# Patient Record
Sex: MALE | Race: WHITE | Employment: FULL TIME | URBAN - METROPOLITAN AREA
[De-identification: names, ages, dates, MRNs, and addresses within clinical notes are randomized per-mention and may not be internally consistent; named-entity substitution may affect disease eponyms.]

---

## 2018-07-18 ENCOUNTER — OFFICE VISIT (OUTPATIENT)
Dept: FAMILY MEDICINE CLINIC | Age: 28
End: 2018-07-18

## 2018-07-18 VITALS
OXYGEN SATURATION: 98 % | BODY MASS INDEX: 26.18 KG/M2 | HEART RATE: 84 BPM | DIASTOLIC BLOOD PRESSURE: 84 MMHG | WEIGHT: 215 LBS | SYSTOLIC BLOOD PRESSURE: 142 MMHG | HEIGHT: 76 IN

## 2018-07-18 DIAGNOSIS — R07.89 OTHER CHEST PAIN: Primary | ICD-10-CM

## 2018-07-18 DIAGNOSIS — Z11.4 SCREENING FOR HIV WITHOUT PRESENCE OF RISK FACTORS: ICD-10-CM

## 2018-07-18 DIAGNOSIS — R10.13 EPIGASTRIC PAIN: ICD-10-CM

## 2018-07-18 LAB
A/G RATIO: 2 (ref 1.1–2.2)
ALBUMIN SERPL-MCNC: 4.8 G/DL (ref 3.4–5)
ALP BLD-CCNC: 58 U/L (ref 40–129)
ALT SERPL-CCNC: 16 U/L (ref 10–40)
AMYLASE: 57 U/L (ref 25–115)
ANION GAP SERPL CALCULATED.3IONS-SCNC: 15 MMOL/L (ref 3–16)
AST SERPL-CCNC: 14 U/L (ref 15–37)
BASOPHILS ABSOLUTE: 0 K/UL (ref 0–0.2)
BASOPHILS RELATIVE PERCENT: 0.6 %
BILIRUB SERPL-MCNC: 0.7 MG/DL (ref 0–1)
BILIRUBIN URINE: NEGATIVE
BLOOD, URINE: NEGATIVE
BUN BLDV-MCNC: 21 MG/DL (ref 7–20)
CALCIUM SERPL-MCNC: 9.9 MG/DL (ref 8.3–10.6)
CHLORIDE BLD-SCNC: 101 MMOL/L (ref 99–110)
CLARITY: CLEAR
CO2: 25 MMOL/L (ref 21–32)
COLOR: NORMAL
CREAT SERPL-MCNC: 1.1 MG/DL (ref 0.9–1.3)
EOSINOPHILS ABSOLUTE: 0.1 K/UL (ref 0–0.6)
EOSINOPHILS RELATIVE PERCENT: 1.1 %
EPITHELIAL CELLS, UA: 0 /HPF (ref 0–5)
GFR AFRICAN AMERICAN: >60
GFR NON-AFRICAN AMERICAN: >60
GLOBULIN: 2.4 G/DL
GLUCOSE BLD-MCNC: 124 MG/DL (ref 70–99)
GLUCOSE URINE: NEGATIVE MG/DL
HCT VFR BLD CALC: 44.2 % (ref 40.5–52.5)
HEMOGLOBIN: 15.3 G/DL (ref 13.5–17.5)
HYALINE CASTS: 0 /LPF (ref 0–8)
KETONES, URINE: NEGATIVE MG/DL
LEUKOCYTE ESTERASE, URINE: NEGATIVE
LIPASE: 29 U/L (ref 13–60)
LYMPHOCYTES ABSOLUTE: 2 K/UL (ref 1–5.1)
LYMPHOCYTES RELATIVE PERCENT: 30.6 %
MCH RBC QN AUTO: 31.1 PG (ref 26–34)
MCHC RBC AUTO-ENTMCNC: 34.7 G/DL (ref 31–36)
MCV RBC AUTO: 89.6 FL (ref 80–100)
MICROSCOPIC EXAMINATION: NORMAL
MONOCYTES ABSOLUTE: 0.4 K/UL (ref 0–1.3)
MONOCYTES RELATIVE PERCENT: 5.7 %
NEUTROPHILS ABSOLUTE: 4.1 K/UL (ref 1.7–7.7)
NEUTROPHILS RELATIVE PERCENT: 62 %
NITRITE, URINE: NEGATIVE
PDW BLD-RTO: 12.7 % (ref 12.4–15.4)
PH UA: 5.5
PLATELET # BLD: 180 K/UL (ref 135–450)
PMV BLD AUTO: 9.4 FL (ref 5–10.5)
POTASSIUM SERPL-SCNC: 3.9 MMOL/L (ref 3.5–5.1)
PROTEIN UA: NEGATIVE MG/DL
RBC # BLD: 4.93 M/UL (ref 4.2–5.9)
RBC UA: 2 /HPF (ref 0–4)
SODIUM BLD-SCNC: 141 MMOL/L (ref 136–145)
SPECIFIC GRAVITY UA: 1.03
TOTAL PROTEIN: 7.2 G/DL (ref 6.4–8.2)
TSH REFLEX: 0.84 UIU/ML (ref 0.27–4.2)
UROBILINOGEN, URINE: 0.2 E.U./DL
WBC # BLD: 6.7 K/UL (ref 4–11)
WBC UA: 0 /HPF (ref 0–5)

## 2018-07-18 PROCEDURE — 99203 OFFICE O/P NEW LOW 30 MIN: CPT | Performed by: FAMILY MEDICINE

## 2018-07-18 PROCEDURE — 1036F TOBACCO NON-USER: CPT | Performed by: FAMILY MEDICINE

## 2018-07-18 PROCEDURE — G8427 DOCREV CUR MEDS BY ELIG CLIN: HCPCS | Performed by: FAMILY MEDICINE

## 2018-07-18 PROCEDURE — G8419 CALC BMI OUT NRM PARAM NOF/U: HCPCS | Performed by: FAMILY MEDICINE

## 2018-07-18 PROCEDURE — 36415 COLL VENOUS BLD VENIPUNCTURE: CPT | Performed by: FAMILY MEDICINE

## 2018-07-18 ASSESSMENT — PATIENT HEALTH QUESTIONNAIRE - PHQ9
1. LITTLE INTEREST OR PLEASURE IN DOING THINGS: 0
SUM OF ALL RESPONSES TO PHQ QUESTIONS 1-9: 0
SUM OF ALL RESPONSES TO PHQ9 QUESTIONS 1 & 2: 0
2. FEELING DOWN, DEPRESSED OR HOPELESS: 0

## 2018-07-18 ASSESSMENT — ENCOUNTER SYMPTOMS
COUGH: 0
EYE PAIN: 0
ABDOMINAL PAIN: 1
CONSTIPATION: 0
VOMITING: 0
COLOR CHANGE: 0
EYE DISCHARGE: 0
DIARRHEA: 0
CHEST TIGHTNESS: 0
RHINORRHEA: 0
BLOOD IN STOOL: 0
SINUS PRESSURE: 0
WHEEZING: 0
SHORTNESS OF BREATH: 0

## 2018-07-18 NOTE — PROGRESS NOTES
Subjective:      Patient ID: Alex Samayoa is a 33 y.o. male. HPI  Chief Complaint   Patient presents with    New Patient     Patient is here to establish care with Dr. Eldon Forrest as a new patient.  Pain     He mentions he has pain in his left rib cage for about 2 years now. Has had EKG's which always come back normal    Abdominal Pain     In addition he has stomach cramps and pain shortly after he eats, this began a few weeks ago     Here to establish care. Nonsmoker. Last PCP unknown. Has been using emergency rooms for care. States typically healthy. he has had intermittent chest pressure for about 2 yrs. located on the left side around lower rib cage. States feels a stabbing pain at times. Other times feels just like a constant pressure. Denies any shortness of breath, cough, hemoptysis. Denies any weight change. States does not come on with any movement or position change. Denies any reflux or chronic indigestion. Not affected by food has had chest x-rays and EKG at the emergency room most recently last year at [facility]. All was within normal limits. He uses the happens when he lies flat. No radiation of pain  Also complain of abdominal pain and cramping for the last 2 weeks after eating. Happens with eating anything. Denies any vomiting, diarrhea, blood in the stool. Denies any change in diet. Has not taken anything for it. Has not tried antacids.   States this week pain is gone  Alex Samayoa is a 33 y.o. male with the following history as recorded in [facility]:  Patient Active Problem List    Diagnosis Date Noted    Medial meniscus tear 09/18/2013     Priority: High     Class: Acute    Knee effusion, left 09/12/2013     Priority: High     Class: Acute    Left knee pain 09/12/2013     Priority: High     Class: Acute     Current Outpatient Prescriptions   Medication Sig Dispense Refill    Multiple Vitamins-Minerals (THERAPEUTIC MULTIVITAMIN-MINERALS) tablet Take 1 tablet by mouth daily No current facility-administered medications for this visit. Allergies: Zithromax [azithromycin]  Past Medical History:   Diagnosis Date    Bronchitis      Past Surgical History:   Procedure Laterality Date    KNEE ARTHROSCOPY Left 12/24/2013    KNEE SURGERY Right      Family History   Problem Relation Age of Onset    No Known Problems Sister     No Known Problems Brother     Lung Cancer Maternal Grandmother     No Known Problems Sister     No Known Problems Brother     Other Brother         Muscular Dystrophy     Social History   Substance Use Topics    Smoking status: Never Smoker    Smokeless tobacco: Never Used    Alcohol use Yes      Comment: occasional     There were no vitals filed for this visit. There is no height or weight on file to calculate BMI. Wt Readings from Last 3 Encounters:   06/17/17 205 lb (93 kg)   08/12/16 205 lb (93 kg)   01/12/16 210 lb (95.3 kg)     BP Readings from Last 3 Encounters:   06/17/17 138/84   08/12/16 148/81   01/13/16 142/86            Review of Systems   Constitutional: Negative for fatigue, fever and unexpected weight change. HENT: Negative for congestion, ear discharge, ear pain, hearing loss, rhinorrhea and sinus pressure. Eyes: Negative for pain, discharge and visual disturbance. Respiratory: Negative for cough, chest tightness, shortness of breath and wheezing. Cardiovascular: Positive for chest pain. Negative for palpitations and leg swelling. Gastrointestinal: Positive for abdominal pain. Negative for blood in stool, constipation, diarrhea and vomiting. Genitourinary: Negative for difficulty urinating, dysuria and hematuria. Musculoskeletal: Negative for arthralgias and myalgias. Skin: Negative for color change and rash. Neurological: Negative for dizziness, seizures, syncope and headaches. Hematological: Negative for adenopathy. Does not bruise/bleed easily.    Psychiatric/Behavioral: Negative for dysphoric mood and sleep disturbance. The patient is not nervous/anxious (.orders). Objective:   Physical Exam   Constitutional: He is oriented to person, place, and time. He appears well-developed and well-nourished. No distress. HENT:   Head: Normocephalic. Right Ear: External ear normal.   Left Ear: External ear normal.   Nose: Nose normal.   Mouth/Throat: Oropharynx is clear and moist. No oropharyngeal exudate. Eyes: Conjunctivae and EOM are normal. Pupils are equal, round, and reactive to light. No scleral icterus. Neck: Neck supple. No thyromegaly present. Cardiovascular: Normal rate, regular rhythm, normal heart sounds and intact distal pulses. No murmur heard. Pulmonary/Chest: Effort normal and breath sounds normal. He has no wheezes. He exhibits no tenderness (unable to reprodcue pain with palpation). Abdominal: Soft. Bowel sounds are normal. He exhibits no distension. There is no tenderness. There is no rebound and no guarding. Musculoskeletal: Normal range of motion. He exhibits no edema or tenderness. Lymphadenopathy:     He has no cervical adenopathy. Neurological: He is alert and oriented to person, place, and time. No cranial nerve deficit. Coordination normal.   Skin: Skin is warm and dry. No erythema. Psychiatric: He has a normal mood and affect. His behavior is normal. Judgment and thought content normal.       Assessment:      Chest pain- non cardiac, not reproducible x 2 yrs. cxr clear, EKG wnl- get CT scan  abd pain-?  Acute gastritis      Plan:       Orders Placed This Encounter   Procedures    CT Chest W Contrast     Standing Status:   Future     Standing Expiration Date:   7/18/2019     Order Specific Question:   Reason for exam:     Answer:   left sided chest pain x 2 years    TSH with Reflex    CBC WITH AUTO DIFFERENTIAL    COMPREHENSIVE METABOLIC PANEL    AMYLASE    LIPASE    HIV-1 AND HIV-2 ANTIBODIES    MICROSCOPIC URINALYSIS     Standing Status:   Future     Standing

## 2018-07-19 LAB
HIV AG/AB: NORMAL
HIV ANTIGEN: NORMAL
HIV-1 ANTIBODY: NORMAL
HIV-2 AB: NORMAL

## 2018-08-01 ENCOUNTER — HOSPITAL ENCOUNTER (OUTPATIENT)
Dept: CT IMAGING | Age: 28
Discharge: HOME | End: 2018-08-01
Payer: MEDICAID

## 2018-08-01 DIAGNOSIS — R07.89 OTHER CHEST PAIN: ICD-10-CM

## 2018-08-01 PROCEDURE — 71260 CT THORAX DX C+: CPT

## 2018-08-01 PROCEDURE — 6360000004 HC RX CONTRAST MEDICATION: Performed by: FAMILY MEDICINE

## 2018-08-01 RX ADMIN — IOPAMIDOL 75 ML: 755 INJECTION, SOLUTION INTRAVENOUS at 14:23

## 2018-08-07 ENCOUNTER — TELEPHONE (OUTPATIENT)
Dept: FAMILY MEDICINE CLINIC | Age: 28
End: 2018-08-07

## 2018-08-08 ENCOUNTER — OFFICE VISIT (OUTPATIENT)
Dept: FAMILY MEDICINE CLINIC | Age: 28
End: 2018-08-08

## 2018-08-08 VITALS
HEART RATE: 68 BPM | DIASTOLIC BLOOD PRESSURE: 68 MMHG | WEIGHT: 218.6 LBS | TEMPERATURE: 98 F | SYSTOLIC BLOOD PRESSURE: 120 MMHG | OXYGEN SATURATION: 98 % | HEIGHT: 76 IN | BODY MASS INDEX: 26.62 KG/M2

## 2018-08-08 DIAGNOSIS — R16.1 SPLENOMEGALY: ICD-10-CM

## 2018-08-08 DIAGNOSIS — R10.13 EPIGASTRIC PAIN: ICD-10-CM

## 2018-08-08 DIAGNOSIS — J01.00 ACUTE MAXILLARY SINUSITIS, RECURRENCE NOT SPECIFIED: Primary | ICD-10-CM

## 2018-08-08 PROCEDURE — 99213 OFFICE O/P EST LOW 20 MIN: CPT | Performed by: FAMILY MEDICINE

## 2018-08-08 PROCEDURE — G8427 DOCREV CUR MEDS BY ELIG CLIN: HCPCS | Performed by: FAMILY MEDICINE

## 2018-08-08 PROCEDURE — G8419 CALC BMI OUT NRM PARAM NOF/U: HCPCS | Performed by: FAMILY MEDICINE

## 2018-08-08 PROCEDURE — 1036F TOBACCO NON-USER: CPT | Performed by: FAMILY MEDICINE

## 2018-08-08 RX ORDER — RANITIDINE 150 MG/1
150 TABLET ORAL DAILY
Qty: 30 TABLET | Refills: 3 | Status: SHIPPED
Start: 2018-08-08 | End: 2020-10-23 | Stop reason: ALTCHOICE

## 2018-08-08 RX ORDER — AMOXICILLIN 500 MG/1
500 CAPSULE ORAL 2 TIMES DAILY
Qty: 20 CAPSULE | Refills: 0 | Status: SHIPPED | OUTPATIENT
Start: 2018-08-08 | End: 2018-08-18

## 2018-08-08 ASSESSMENT — ENCOUNTER SYMPTOMS
COUGH: 0
SHORTNESS OF BREATH: 0
SINUS PRESSURE: 1
ABDOMINAL PAIN: 1
RHINORRHEA: 1
CHEST TIGHTNESS: 0
SORE THROAT: 0
WHEEZING: 0

## 2018-08-08 NOTE — PROGRESS NOTES
Subjective:      Patient ID: Davis Santizo is a 33 y.o. male. HPI  Chief Complaint   Patient presents with    Abdominal Pain     Patient is here with continued complaints of abdominal pain. Pain has been recurrent for about 2 months, pain is located in middle quadrant of abdomen. Pain went away for 2 weeks but returned yesterday, usually is a dull ache but will get episodes that are severe causing him to double over.  Nasal Congestion     He also mentions he has some nasal congestion and mild cough which began a few days ago. Here for c/o pain in stomach x few days, on and off. Worse yesterday. Feels it in middle. No fever, vomiting or diarrhea. No burning. Appetite not affected  No otc meds  Also c/o sinus drainage, thick colored and sinus pressure x months. No allergy meds used  Vitals:    08/08/18 0939   BP: 120/68   Pulse: 68   Temp: 98 °F (36.7 °C)   TempSrc: Oral   SpO2: 98%   Weight: 218 lb 9.6 oz (99.2 kg)   Height: 6' 4\" (1.93 m)     Body mass index is 26.61 kg/m². Wt Readings from Last 3 Encounters:   08/08/18 218 lb 9.6 oz (99.2 kg)   07/18/18 215 lb (97.5 kg)   06/17/17 205 lb (93 kg)     BP Readings from Last 3 Encounters:   08/08/18 120/68   07/18/18 (!) 142/84   06/17/17 138/84        Review of Systems   Constitutional: Negative for chills and fever. HENT: Positive for congestion, postnasal drip, rhinorrhea and sinus pressure. Negative for sore throat. Respiratory: Negative for cough, chest tightness, shortness of breath and wheezing. Cardiovascular: Negative for chest pain. Gastrointestinal: Positive for abdominal pain. Objective:   Physical Exam   Constitutional: He is oriented to person, place, and time. He appears well-developed and well-nourished. No distress. HENT:   Head: Normocephalic. Right Ear: External ear normal.   Left Ear: External ear normal.   ++nasal edema and erythema  + post pharynx red   Neck: Neck supple.    Cardiovascular: Normal rate, regular rhythm and normal heart sounds. Pulmonary/Chest: Effort normal and breath sounds normal. No respiratory distress. He has no wheezes. Abdominal: He exhibits no distension. There is no tenderness. There is no rebound and no guarding. Neurological: He is alert and oriented to person, place, and time. No cranial nerve deficit. Coordination normal.       Assessment:      Sinusitis- x months, add abx  Epigastric pain-?  Gastritis, try zantac      Plan:      Orders Placed This Encounter   Procedures   Shereen De La Garza MD     Referral Priority:   Routine     Referral Type:   Consult for Advice and Opinion     Referral Reason:   Specialty Services Required     Referred to Provider:   Brooklynn Lim MD     Requested Specialty:   Hematology and Oncology     Number of Visits Requested:   1     Orders Placed This Encounter   Medications    amoxicillin (AMOXIL) 500 MG capsule     Sig: Take 1 capsule by mouth 2 times daily for 10 days     Dispense:  20 capsule     Refill:  0    ranitidine (ZANTAC) 150 MG tablet     Sig: Take 1 tablet by mouth daily     Dispense:  30 tablet     Refill:  3             ESTEFANIA [address] DO RYAN

## 2018-08-08 NOTE — PATIENT INSTRUCTIONS

## 2018-08-23 ENCOUNTER — HOSPITAL ENCOUNTER (OUTPATIENT)
Dept: ULTRASOUND IMAGING | Age: 28
Discharge: HOME | End: 2018-08-23
Payer: MEDICAID

## 2018-08-23 DIAGNOSIS — R16.1 SPLENOMEGALY: ICD-10-CM

## 2018-08-23 PROCEDURE — 76705 ECHO EXAM OF ABDOMEN: CPT

## 2020-10-23 ENCOUNTER — HOSPITAL ENCOUNTER (OUTPATIENT)
Age: 30
Setting detail: OBSERVATION
Discharge: HOME | End: 2020-10-24
Attending: EMERGENCY MEDICINE | Admitting: INTERNAL MEDICINE
Payer: COMMERCIAL

## 2020-10-23 ENCOUNTER — APPOINTMENT (OUTPATIENT)
Dept: GENERAL RADIOLOGY | Age: 30
End: 2020-10-23
Payer: COMMERCIAL

## 2020-10-23 ENCOUNTER — APPOINTMENT (OUTPATIENT)
Dept: CT IMAGING | Age: 30
End: 2020-10-23
Payer: COMMERCIAL

## 2020-10-23 PROBLEM — R20.2 FACIAL PARESTHESIA: Status: ACTIVE | Noted: 2020-10-23

## 2020-10-23 LAB
A/G RATIO: 2.2 (ref 1.1–2.2)
ALBUMIN SERPL-MCNC: 4.9 G/DL (ref 3.4–5)
ALP BLD-CCNC: 68 U/L (ref 40–129)
ALT SERPL-CCNC: 24 U/L (ref 10–40)
ANION GAP SERPL CALCULATED.3IONS-SCNC: 11 MMOL/L (ref 3–16)
AST SERPL-CCNC: 18 U/L (ref 15–37)
BASOPHILS ABSOLUTE: 0.1 K/UL (ref 0–0.2)
BASOPHILS RELATIVE PERCENT: 0.7 %
BILIRUB SERPL-MCNC: 0.4 MG/DL (ref 0–1)
BUN BLDV-MCNC: 17 MG/DL (ref 7–20)
CALCIUM SERPL-MCNC: 9.4 MG/DL (ref 8.3–10.6)
CHLORIDE BLD-SCNC: 100 MMOL/L (ref 99–110)
CO2: 26 MMOL/L (ref 21–32)
CREAT SERPL-MCNC: 1.2 MG/DL (ref 0.9–1.3)
EOSINOPHILS ABSOLUTE: 0.1 K/UL (ref 0–0.6)
EOSINOPHILS RELATIVE PERCENT: 1.3 %
GFR AFRICAN AMERICAN: >60
GFR NON-AFRICAN AMERICAN: >60
GLOBULIN: 2.2 G/DL
GLUCOSE BLD-MCNC: 98 MG/DL (ref 70–99)
HCT VFR BLD CALC: 43 % (ref 40.5–52.5)
HEMOGLOBIN: 14.8 G/DL (ref 13.5–17.5)
INR BLD: 1 (ref 0.86–1.14)
LYMPHOCYTES ABSOLUTE: 2.1 K/UL (ref 1–5.1)
LYMPHOCYTES RELATIVE PERCENT: 30.1 %
MAGNESIUM: 2.1 MG/DL (ref 1.8–2.4)
MCH RBC QN AUTO: 30.7 PG (ref 26–34)
MCHC RBC AUTO-ENTMCNC: 34.5 G/DL (ref 31–36)
MCV RBC AUTO: 89.2 FL (ref 80–100)
MONOCYTES ABSOLUTE: 0.5 K/UL (ref 0–1.3)
MONOCYTES RELATIVE PERCENT: 7.4 %
NEUTROPHILS ABSOLUTE: 4.3 K/UL (ref 1.7–7.7)
NEUTROPHILS RELATIVE PERCENT: 60.5 %
PDW BLD-RTO: 12.9 % (ref 12.4–15.4)
PHOSPHORUS: 3.5 MG/DL (ref 2.5–4.9)
PLATELET # BLD: 176 K/UL (ref 135–450)
PMV BLD AUTO: 9.3 FL (ref 5–10.5)
POTASSIUM REFLEX MAGNESIUM: 3.9 MMOL/L (ref 3.5–5.1)
PROTHROMBIN TIME: 11.6 SEC (ref 10–13.2)
RBC # BLD: 4.82 M/UL (ref 4.2–5.9)
SODIUM BLD-SCNC: 137 MMOL/L (ref 136–145)
TOTAL PROTEIN: 7.1 G/DL (ref 6.4–8.2)
TROPONIN: <0.01 NG/ML
WBC # BLD: 7.1 K/UL (ref 4–11)

## 2020-10-23 PROCEDURE — 85610 PROTHROMBIN TIME: CPT

## 2020-10-23 PROCEDURE — G0378 HOSPITAL OBSERVATION PER HR: HCPCS

## 2020-10-23 PROCEDURE — 84484 ASSAY OF TROPONIN QUANT: CPT

## 2020-10-23 PROCEDURE — 6370000000 HC RX 637 (ALT 250 FOR IP): Performed by: INTERNAL MEDICINE

## 2020-10-23 PROCEDURE — 99285 EMERGENCY DEPT VISIT HI MDM: CPT

## 2020-10-23 PROCEDURE — 70450 CT HEAD/BRAIN W/O DYE: CPT

## 2020-10-23 PROCEDURE — 71045 X-RAY EXAM CHEST 1 VIEW: CPT

## 2020-10-23 PROCEDURE — 6360000004 HC RX CONTRAST MEDICATION: Performed by: EMERGENCY MEDICINE

## 2020-10-23 PROCEDURE — 85025 COMPLETE CBC W/AUTO DIFF WBC: CPT

## 2020-10-23 PROCEDURE — 70496 CT ANGIOGRAPHY HEAD: CPT

## 2020-10-23 PROCEDURE — 80053 COMPREHEN METABOLIC PANEL: CPT

## 2020-10-23 PROCEDURE — 84100 ASSAY OF PHOSPHORUS: CPT

## 2020-10-23 PROCEDURE — 93005 ELECTROCARDIOGRAM TRACING: CPT | Performed by: EMERGENCY MEDICINE

## 2020-10-23 PROCEDURE — 87529 HSV DNA AMP PROBE: CPT

## 2020-10-23 PROCEDURE — 83735 ASSAY OF MAGNESIUM: CPT

## 2020-10-23 RX ORDER — MAGNESIUM SULFATE IN WATER 40 MG/ML
2 INJECTION, SOLUTION INTRAVENOUS PRN
Status: DISCONTINUED | OUTPATIENT
Start: 2020-10-23 | End: 2020-10-24 | Stop reason: HOSPADM

## 2020-10-23 RX ORDER — ACETAMINOPHEN 650 MG/1
650 SUPPOSITORY RECTAL EVERY 6 HOURS PRN
Status: DISCONTINUED | OUTPATIENT
Start: 2020-10-23 | End: 2020-10-24 | Stop reason: HOSPADM

## 2020-10-23 RX ORDER — POTASSIUM CHLORIDE 7.45 MG/ML
10 INJECTION INTRAVENOUS PRN
Status: DISCONTINUED | OUTPATIENT
Start: 2020-10-23 | End: 2020-10-24 | Stop reason: HOSPADM

## 2020-10-23 RX ORDER — SODIUM CHLORIDE 0.9 % (FLUSH) 0.9 %
10 SYRINGE (ML) INJECTION PRN
Status: DISCONTINUED | OUTPATIENT
Start: 2020-10-23 | End: 2020-10-24 | Stop reason: HOSPADM

## 2020-10-23 RX ORDER — ONDANSETRON 2 MG/ML
4 INJECTION INTRAMUSCULAR; INTRAVENOUS EVERY 6 HOURS PRN
Status: DISCONTINUED | OUTPATIENT
Start: 2020-10-23 | End: 2020-10-24 | Stop reason: HOSPADM

## 2020-10-23 RX ORDER — POLYETHYLENE GLYCOL 3350 17 G/17G
17 POWDER, FOR SOLUTION ORAL DAILY PRN
Status: DISCONTINUED | OUTPATIENT
Start: 2020-10-23 | End: 2020-10-24 | Stop reason: HOSPADM

## 2020-10-23 RX ORDER — SODIUM CHLORIDE 0.9 % (FLUSH) 0.9 %
10 SYRINGE (ML) INJECTION EVERY 12 HOURS SCHEDULED
Status: DISCONTINUED | OUTPATIENT
Start: 2020-10-23 | End: 2020-10-24 | Stop reason: HOSPADM

## 2020-10-23 RX ORDER — FAMOTIDINE 20 MG/1
20 TABLET, FILM COATED ORAL DAILY PRN
Status: DISCONTINUED | OUTPATIENT
Start: 2020-10-23 | End: 2020-10-24 | Stop reason: HOSPADM

## 2020-10-23 RX ORDER — PROMETHAZINE HYDROCHLORIDE 25 MG/1
12.5 TABLET ORAL EVERY 6 HOURS PRN
Status: DISCONTINUED | OUTPATIENT
Start: 2020-10-23 | End: 2020-10-24 | Stop reason: HOSPADM

## 2020-10-23 RX ORDER — ACETAMINOPHEN 325 MG/1
650 TABLET ORAL EVERY 6 HOURS PRN
Status: DISCONTINUED | OUTPATIENT
Start: 2020-10-23 | End: 2020-10-24 | Stop reason: HOSPADM

## 2020-10-23 RX ADMIN — ASPIRIN 325 MG: 325 TABLET, COATED ORAL at 23:31

## 2020-10-23 RX ADMIN — IOPAMIDOL 65 ML: 755 INJECTION, SOLUTION INTRAVENOUS at 19:41

## 2020-10-23 ASSESSMENT — PAIN SCALES - GENERAL: PAINLEVEL_OUTOF10: 0

## 2020-10-23 NOTE — ED PROVIDER NOTES
[facility]  ED PROVIDER NOTE    Patient Identification  Pt Name: Ada Mills  MRN: [id number]  Александр 1990  Date of evaluation: 10/23/2020  Provider: Stella Reid MD  PCP: Annalisa DAVIS DO    Chief Complaint  Tingling (left side of face, neck, down left arm. Denies tingling in left leg. Pt states this has been going on since Monday. Pt denies weakness. )      HPI  History provided by patient   This is a 31 y.o. male who presents to the ED for paresthesias on the left face including forehead, neck, and left arm and hand. Has been constant since Monday. Woke up with it. Never had it before. No weakness. Denies numbness. Denies injury. Denies pain. Specifically no headache, neck pain. Denies any medical problems. ROS  10 systems reviewed, pertinent positives/negatives per HPI otherwise noted to be negative. I have reviewed the following nursing documentation:  Allergies: Zithromax [azithromycin]    Past medical history:   Past Medical History:   Diagnosis Date    Bronchitis      Past surgical history:   Past Surgical History:   Procedure Laterality Date    KNEE ARTHROSCOPY Left 12/24/2013    KNEE SURGERY Right        Home medications:   Previous Medications    No medications on file       Social history:  reports that he has never smoked. He has never used smokeless tobacco. He reports current alcohol use. He reports that he does not use drugs. Family history:    Family History   Problem Relation Age of Onset    No Known Problems Sister     No Known Problems Brother     Lung Cancer Maternal Grandmother     No Known Problems Sister     No Known Problems Brother     Other Brother         Muscular Dystrophy         Exam  ED Triage Vitals [10/23/20 1615]   BP Temp Temp Source Pulse Resp SpO2 Height Weight   (!) 155/95 98.4 °F (36.9 °C) Oral 77 16 99 % -- --     Nursing note and vitals reviewed.   Constitutional: In no acute distress  HENT:      Head: Normocephalic Ears: External ears normal.      Nose: Nose normal.     Mouth: Membrane mucosa moist   Eyes: No discharge. Neck: Supple. Trachea midline. Cardiovascular: Regular rate. Warm extremities  Pulmonary/Chest: Effort normal. No respiratory distress. Abdominal: Soft. No distension. Nontender  : Deferred  Rectal: Deferred   Musculoskeletal: Moves all extremities. No gross deformity. Neurological: Alert and oriented. Face symmetric. Speech is clear. Normal sensation to light palpation over the face, arms, legs. Normal biceps reflexes. Normal bilateral shoulder, elbow, wrist, hip, knee, ankle flexion/extension strength. Extraocular movements intact. Pupils equal round and reactive to light. Normal finger-to-nose. Normal gait. Uvula midline. Normal sensation over the face. Normal extinction exam.  Skin: Warm and dry. No rash. Psychiatric: Normal mood and affect. Behavior is normal.    Procedures      EKG  The Ekg interpreted by me in the absence of a cardiologist shows. Normal sinus rhythm. No specific ST-T wave changes appreciated. No evidence of acute ischemia. Radiology  No orders to display       Labs  No results found for this visit on 10/23/20. Screenings  NIH Stroke Scale  NIH Stroke Scale Assessed: Yes  Interval: Baseline  Level of Consciousness (1a. ): Alert  LOC Questions (1b. ):  Answers both correctly  LOC Commands (1c. ): Performs both tasks correctly  Best Gaze (2. ): Normal  Visual (3. ): No visual loss  Facial Palsy (4. ): Normal symmetrical movement  Motor Arm, Left (5a. ): No drift  Motor Arm, Right (5b. ): No drift  Motor Leg, Left (6a. ): No drift  Motor Leg, Right (6b. ): No drift  Limb Ataxia (7. ): Absent  Sensory (8. ): Normal  Best Language (9. ): No aphasia  Dysarthria (10. ): Normal  Extinction and Inattention (11): No abnormality  Total: 0        MDM and ED Course  Patient is a 67-year-old man who presents with paresthesias of the left face and left arm including the left neck. There is no pain or weakness. My neurologic exam is normal with NIH stroke scale 0. I do not currently have an exhalation for his paresthesias. He is not currently on any medications. Will discuss with  stroke team.  (EMP MDM)    Patient well outside the window for TPA. Discussed with  stroke team who did recommend that he be worked up for CVA. Differential also includes demyelinating disease patient would benefit from MRI. Will obtain CTA head and neck. Will admit patient to hospitalist service    CT scans unremarkable. Reevaluated the patient and my neurologic exam is unchanged. He is resting comfortably. Admitted to hospitalist service. The total critical care time spent while evaluating and treating this patient was at least 32 minutes. This excludes time spent doing separately billable procedures. This includes time at the bedside, data interpretation, medication management, obtaining critical history from collateral sources if the patient is unable to provide it directly, and physician consultation. Specifics of interventions taken and potentially life-threatening diagnostic considerations are listed above in the medical decision making. [unfilled]    Final Impression  1. Paresthesias        Blood pressure (!) 155/95, pulse 77, temperature 98.4 °F (36.9 °C), temperature source Oral, resp. rate 16, SpO2 99 %. Disposition:  DISPOSITION        Patient Referrals:  No follow-up provider specified. Discharge Medications:  New Prescriptions    No medications on file       Discontinued Medications:  Discontinued Medications    MULTIPLE VITAMINS-MINERALS (THERAPEUTIC MULTIVITAMIN-MINERALS) TABLET    Take 1 tablet by mouth daily    RANITIDINE (ZANTAC) 150 MG TABLET    Take 1 tablet by mouth daily       This chart was generated using the [address] [place] [address] [address] dictation system.  I created this record but it may contain dictation errors given the limitations of this technology.         Rashaun Huynh MD  10/23/20 2056

## 2020-10-23 NOTE — ED TRIAGE NOTES
Casper Resendez is a 32 y/o M who presents to the ED via POV for constant tingling of the left arm, face, and neck. Pt reports symptoms began some time on Monday. Pt denies weakness, deficits of the legs, recent falls/injuries, cardiac Hx, neuro Hx. Pt resting in bed with call light within reach and updated on plan of care; pending lab results, pending CT. Pt denies any needs at this time.

## 2020-10-24 ENCOUNTER — APPOINTMENT (OUTPATIENT)
Dept: MRI IMAGING | Age: 30
End: 2020-10-24
Payer: COMMERCIAL

## 2020-10-24 VITALS
DIASTOLIC BLOOD PRESSURE: 82 MMHG | WEIGHT: 238.98 LBS | BODY MASS INDEX: 28.22 KG/M2 | HEART RATE: 72 BPM | HEIGHT: 77 IN | TEMPERATURE: 97.8 F | RESPIRATION RATE: 16 BRPM | OXYGEN SATURATION: 97 % | SYSTOLIC BLOOD PRESSURE: 137 MMHG

## 2020-10-24 LAB
EKG ATRIAL RATE: 69 BPM
EKG DIAGNOSIS: NORMAL
EKG P AXIS: 64 DEGREES
EKG P-R INTERVAL: 172 MS
EKG Q-T INTERVAL: 370 MS
EKG QRS DURATION: 90 MS
EKG QTC CALCULATION (BAZETT): 396 MS
EKG R AXIS: 75 DEGREES
EKG T AXIS: 31 DEGREES
EKG VENTRICULAR RATE: 69 BPM

## 2020-10-24 PROCEDURE — 97165 OT EVAL LOW COMPLEX 30 MIN: CPT

## 2020-10-24 PROCEDURE — 93010 ELECTROCARDIOGRAM REPORT: CPT | Performed by: INTERNAL MEDICINE

## 2020-10-24 PROCEDURE — 6360000004 HC RX CONTRAST MEDICATION: Performed by: HOSPITALIST

## 2020-10-24 PROCEDURE — G0378 HOSPITAL OBSERVATION PER HR: HCPCS

## 2020-10-24 PROCEDURE — A9579 GAD-BASE MR CONTRAST NOS,1ML: HCPCS | Performed by: HOSPITALIST

## 2020-10-24 PROCEDURE — 70553 MRI BRAIN STEM W/O & W/DYE: CPT

## 2020-10-24 PROCEDURE — 72156 MRI NECK SPINE W/O & W/DYE: CPT

## 2020-10-24 PROCEDURE — 97116 GAIT TRAINING THERAPY: CPT

## 2020-10-24 PROCEDURE — 97161 PT EVAL LOW COMPLEX 20 MIN: CPT

## 2020-10-24 RX ADMIN — GADOTERIDOL 20 ML: 279.3 INJECTION, SOLUTION INTRAVENOUS at 12:08

## 2020-10-24 ASSESSMENT — PAIN SCALES - GENERAL
PAINLEVEL_OUTOF10: 0

## 2020-10-24 NOTE — PLAN OF CARE
Problem: Falls - Risk of:  Goal: Will remain free from falls  Description: Will remain free from falls  10/24/2020 0332 by Reed Flores RN  Outcome: Met This Shift  10/24/2020 0108 by Estephania Naidu RN  Outcome: Ongoing  Goal: Absence of physical injury  Description: Absence of physical injury  10/24/2020 0332 by Reed Flores RN  Outcome: Met This Shift  10/24/2020 0108 by Estephania Naidu RN  Outcome: Ongoing

## 2020-10-24 NOTE — ED NOTES
[facility] @ 2051  RE: admit per Dr. Aniyah Narayanan called back @ 2053       Meti Denisu  10/23/20 2054

## 2020-10-24 NOTE — PROGRESS NOTES
Physical Therapy    Facility/Department: [facility] - MED SURG  Initial Assessment/Discharge Summary    NAME: Kelli Chaidez  : 1990  MRN: [id number]    Date of Service: 10/24/2020    Discharge Recommendations: Home Independently  PT Equipment Recommendations  Equipment Needed: No    Assessment   Assessment: Pt is a 31year old male presents with tingling L face, neck, and arm. Pt demo independence for all functional mobility including bed mobility, transfers, and ambulation. Pt demo 5/5 strength in BLE. Pt currently at baseline and met all therapy goals; no skilled therapy needs currently. Reconsult as needed. Prognosis: Excellent  Decision Making: Low Complexity  PT Education: Goals;PT Role;General Safety;Plan of Care  Patient Education: Pt educated on Role of PT and purpose of physical therapy eval. Pt verbalized understanding  No Skilled PT: Independent with functional mobility   REQUIRES PT FOLLOW UP: No  Activity Tolerance  Activity Tolerance: Patient Tolerated treatment well       Patient Diagnosis(es): The encounter diagnosis was Paresthesias. has a past medical history of Bronchitis. has a past surgical history that includes knee surgery (Right) and Knee arthroscopy (Left, 2013). Restrictions  Restrictions/Precautions  Restrictions/Precautions: Up as Tolerated  Required Braces or Orthoses?: No  Vision/Hearing  Vision: Within Functional Limits  Hearing: Within functional limits     Subjective  General  Chart Reviewed: Yes  Patient assessed for rehabilitation services?: Yes  Family / Caregiver Present: No  Referring Practitioner: Marce Valderrama MD  Referral Date : 10/24/20  Diagnosis: Facial paresthesis  Follows Commands: Within Functional Limits  Subjective  Subjective: pt supine in bed upon arrival, agreeable to therapy evaluation. Pt states besides the tingling, he feels good.   Pain Screening  Patient Currently in Pain: Denies  Vital Signs  Patient Currently in Pain: Denies Frame for Short term goals: 7 days (10/31/2020)  Short term goal 1: Pt will perform bed mobility independently-- MET 10/24/2020  Short term goal 2: Pt will ambulate > 150 ft, no AD, independently-- MET 10/24/2020  Patient Goals   Patient goals : \"to go home\"       Therapy Time   Individual Concurrent Group Co-treatment   Time In 0820         Time Out 9064         Minutes 18         Timed Code Treatment Minutes: [address] [place], [place]

## 2020-10-24 NOTE — PROGRESS NOTES
Pt admission assessment completed. Pt is alert and orient * 4. Doesn't appear to be in pain. Pt is independent and ambulate in room. PIV flushed and patent. Scheduled medications given as ordered. Patient encouraged to use call light with any needs. Patient states understanding, call light in reach, bed alarm on. All safety checks in place and will continue to monitor pt.

## 2020-10-24 NOTE — DISCHARGE SUMMARY
Hospital Medicine Discharge Summary      Patient Identification:   dEwin Downey   : 1990  MRN: [id number]   Account: [de-identified]      Patient's PCP: No primary care provider on file. Admit Date: 10/23/2020     Discharge Date:   10/24/2020    Admitting Physician: Kyra Flores DO     Discharge Physician: Eva Lazaro MD     Consults:     IP CONSULT TO PHARMACY  PHARMACY TO CHANGE BASE FLUIDS  IP CONSULT TO HOSPITALIST    Disposition: Home    Condition at Discharge: Stable    Code Status:  Full Code       Discharge Diagnoses:    Undifferentiated left facial tingling, exacerbated by stress, resolved        Hospital Course:   Edwin Downey is a 31 y.o. male hospitalized   10/23/2020   left facial tingling, loss of other associated neurological symptoms, CT scan of the head showed no large territorial infarct or bleeding, CTA of the head and neck without flow-limiting stenosis. MRI of the head, neck with contrast without acute abnormalities. Patient symptoms resolved, I do not suspect TIA, symptoms exacerbated by stress at work. Patient instructed to follow-up with PCP in 1 week. Patient seen and examined in the day of discharge. Vital signs reviewed. /82   Pulse 72   Temp 97.8 °F (36.6 °C) (Oral)   Resp 16   Ht 6' 4.8\" (1.951 m)   Wt 238 lb 15.7 oz (108.4 kg)   SpO2 97%   BMI 28.49 kg/m²     Patient alert, oriented, comfortable cranial nerves II through XII are intact, no localized weakness or numbness. *. Patient reached the maximum benefit of this hospitalization. Patient  was hemodynamically stable on discharge   Available consultant are in agreement with discharge planning. Patient symptoms was controlled and in agreement with discharge planning.          Patient Instructions:    Discharge lab/important testing/finding that need follow up :  PCP in 1 week    Activity: activity as tolerated    Diet: DIET GENERAL;      Follow-up visits:   No follow-up provider specified. Discharge Medications: There are no discharge medications for this patient. Labs: For convenience and continuity at follow-up the following most recent labs are provided:      CBC:    Lab Results   Component Value Date    WBC 7.1 10/23/2020    HGB 14.8 10/23/2020    HCT 43.0 10/23/2020     10/23/2020       Renal:    Lab Results   Component Value Date     10/23/2020    K 3.9 10/23/2020     10/23/2020    CO2 26 10/23/2020    BUN 17 10/23/2020    CREATININE 1.2 10/23/2020    CALCIUM 9.4 10/23/2020    PHOS 3.5 10/23/2020         Significant Diagnostic Studies    Radiology:   MRI CERVICAL SPINE W WO CONTRAST   Final Result   No acute abnormality or abnormal enhancement in the cervical spine. No significant disc herniation, spinal canal or foraminal stenosis. MRI BRAIN W WO CONTRAST   Final Result   No acute intracranial abnormality. No mass effect or abnormal intracranial   enhancement. CT HEAD WO CONTRAST   Final Result   Negative noncontrast CT of the head. CTA HEAD NECK W CONTRAST   Final Result   Unremarkable CTA of the head and neck. XR CHEST PORTABLE   Preliminary Result   No evidence of acute cardiopulmonary disease. Time Spent on discharge is 35 in the examination, evaluation, counseling and review of medications and discharge plan. Thank you No primary care provider on file. for the opportunity to be involved in this patient's care.          Electronically signed by Eva Lazaro MD on 10/24/2020 at 2:16 PM

## 2020-10-24 NOTE — H&P
Hospital Medicine History & Physical      PCP: No primary care provider on file. Date of Admission: 10/23/2020    Date of Service: Pt seen/examined on 10/23/2020  Pt seen/examined face to face on and admitted as observation with expected LOS less than two midnights but that can change depending on respose to medical therapy and clinical progress. Chief Complaint: Left facial decreased sensation      History Of Present Illness:      31 y.o. male who presented to [facility] with healthy functional past medical history of bronchitis resented to the ED for left facial paresthesia. Patient works with [employer] and uses his left hand a lot with rotational motion in addition to rotating his neck right and left.,  Patient report multiple instances where he did feel numb in the ulnar distribution of his arm up to his neck. Patient woke up this morning usually sleeps on his back however woke up on his left side and noted to have paresthesia on mid face mostly left side distribution on neurological that was intermittent in addition to left neck and arm paresthesia also have occurred this morning but resolved with time. No history of CVA or family history of CVA. Patient is otherwise healthy male and has been going through stress at home. Patient denies smoking drinking or drugs. Past Medical History:          Diagnosis Date    Bronchitis        Past Surgical History:          Procedure Laterality Date    KNEE ARTHROSCOPY Left 12/24/2013    KNEE SURGERY Right        Medications Prior to Admission:      Prior to Admission medications    Not on File       Allergies:  Zithromax [azithromycin]    Social History:          TOBACCO:   reports that he has never smoked. He has never used smokeless tobacco.  ETOH:   reports current alcohol use. E-Cigarettes Vaping or Juuling     Questions Responses    Vaping Use Never User    Start Date     Does device contain nicotine?  Never    Quit Date Vaping Type             Family History:      Reviewed in detail         Problem Relation Age of Onset    No Known Problems Sister     No Known Problems Brother     Lung Cancer Maternal Grandmother     No Known Problems Sister     No Known Problems Brother     Other Brother         Muscular Dystrophy       REVIEW OF SYSTEMS:     Constitutional:  No Fever, No Chills, No Night Sweats  ENT/Mouth:  No Nasal Congestion,  No Hoarseness, No new mouth lesion  Eyes:  No Eye Pain, No Redness, No Discharge  Cardiovascular:  No Chest Pain, No Orthopnea, No Palpitations  Respiratory:  No Cough, No Sputum, No Dyspnea  Gastrointestinal:  No Nausea, No Vomiting, No Diarrhea,   Genitourinary: No Urinary Frequency, No Hematuria, No Urinary pain  Musculoskeletal:  No worsening Arthralgias, No worsening Myalgias  Skin:  No new Skin Lesions, No new skin rash  Neuro:  No new weakness, No new numbness. Psych:  No suicial ideation, No Violence ideation    PHYSICAL EXAM PERFORMED:    BP (!) 148/75   Pulse 79   Temp 97.6 °F (36.4 °C) (Oral)   Resp 16   Ht 6' 4.8\" (1.951 m)   Wt 238 lb 15.7 oz (108.4 kg)   SpO2 97%   BMI 28.49 kg/m²     General appearance:  mild acute distress, appears older than stated age  HEENT:   atraumatic, sclera anicteric, Conjunctivae clear. Neck: Supple,Trachea midline, no goiter  Respiratory:  Normal respiratory effort. Clear to auscultation, bilaterally without Rales/Wheezes/Rhonchi. Cardiovascular:  Regular rate and rhythm without murmurs, capillary refill 2 seconds  Abdomen: Soft, non-tender, non-distended with normal bowel sounds. Musculoskeletal:  No clubbing, cyanosis or edema bilaterally. Skin: turgor normal.  No new rashes or lesions. Neurologic: Alert and oriented x4, no new focal sensory/motor deficits. I palpated patient's ulnar nerve elbow and patient had same numbness paresthesia that he complained about that brought him to the ED.   Spurling maneuver negative  Labs:     Recent Labs 10/23/20  1752   WBC 7.1   HGB 14.8   HCT 43.0        Recent Labs     10/23/20  1752      K 3.9      CO2 26   BUN 17   CREATININE 1.2   CALCIUM 9.4   PHOS 3.5     Recent Labs     10/23/20  1752   AST 18   ALT 24   BILITOT 0.4   ALKPHOS 68     Recent Labs     10/23/20  1752   INR 1.00     Recent Labs     10/23/20  1752   TROPONINI <0.01       Urinalysis:      Lab Results   Component Value Date    NITRU Negative 07/18/2018    WBCUA 0 07/18/2018    RBCUA 2 07/18/2018    BLOODU Negative 07/18/2018    SPECGRAV 1.026 07/18/2018    GLUCOSEU Negative 07/18/2018       Radiology:     CXR: I have reviewed the CXR with the following interpretation:   No acute process  EKG:  I have reviewed the EKG with the following interpretation:   Normal sinus rhythm QTc 396    CT HEAD WO CONTRAST   Final Result   Negative noncontrast CT of the head. CTA HEAD NECK W CONTRAST   Final Result   Unremarkable CTA of the head and neck. XR CHEST PORTABLE   Preliminary Result   No evidence of acute cardiopulmonary disease. MRI brain without contrast    (Results Pending)       ASSESSMENT AND PLAN:    Active Hospital Problems    Diagnosis Date Noted    Facial paresthesia [R20.2] 10/23/2020     1. Facial paresthesia:  Patient does not really have risk factors for CVA  Per stroke team in the ED recommended MRI brain thus ordered  Ordered tests to check for other more common etiologies based on patient's age and risk factors    2. 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 Tinel sign was positive on my exam.   Neurology consulted, much appreciated.     3.Neck pain:  Patient reports the paresthesia occurs from the neck cervical down to his arms  MRI cervical neck ordered    Diet: NPO except meds ordered    OT Eval Status: consulted    DVT Prophylaxis: Lovenox    Dispo:     Expected LOS less

## 2020-10-24 NOTE — PROGRESS NOTES
4 Eyes Skin Assessment     NAME:  Ant Moore OF BIRTH:  1990  MEDICAL RECORD NUMBER:  [id number]    The patient is being assess for  Admission    I agree that 2 RN's have performed a thorough Head to Toe Skin Assessment on the patient. ALL assessment sites listed below have been assessed. Areas assessed by both nurses:    Head, Face, Ears, Shoulders, Back, Chest, Arms, Elbows, Hands, Sacrum. Buttock, Coccyx, Ischium and Legs. Feet and Heels        Does the Patient have a Wound?  No noted wound(s)       Michel Prevention initiated:  No   Wound Care Orders initiated:  No    Pressure Injury (Stage 3,4, Unstageable, DTI, NWPT, and Complex wounds) if present place consult order under [de-identified] No    New and Established Ostomies if present place consult order under : No      Nurse 1 eSignature: Electronically signed by Angel Luis Ingram RN on 10/24/20 at 1:17 AM EDT    **SHARE this note so that the co-signing nurse is able to place an eSignature**    Nurse 2 eSignature: {Esignature:[id number]}

## 2020-10-24 NOTE — CONSULTS
The pharmacist will review this patient's medication profile to              evaluate IV medications and change all base solutions to 0.9% sodium              chloride if possible based on compatibility and product availability. This profile review will inclu    de an assessment of total IV fluids              being administered to the patient. If a current order exists for              continuous infusion of non-hypertonic plain IV fluid, the pharmacist              will contact the prescriber to recommend the discontinuation of the              IV fluid order.

## 2020-10-30 LAB
HERPES SIMPLEX VIRUS BY PCR: NOT DETECTED
HSV SOURCE: NORMAL